# Patient Record
Sex: FEMALE | Race: WHITE | NOT HISPANIC OR LATINO | Employment: PART TIME | URBAN - METROPOLITAN AREA
[De-identification: names, ages, dates, MRNs, and addresses within clinical notes are randomized per-mention and may not be internally consistent; named-entity substitution may affect disease eponyms.]

---

## 2017-01-18 ENCOUNTER — HOSPITAL ENCOUNTER (OUTPATIENT)
Dept: MAMMOGRAPHY | Facility: CLINIC | Age: 47
Discharge: HOME | End: 2017-01-18
Attending: OBSTETRICS & GYNECOLOGY | Admitting: OBSTETRICS & GYNECOLOGY
Payer: COMMERCIAL

## 2017-01-18 DIAGNOSIS — Z12.31 VISIT FOR SCREENING MAMMOGRAM: ICD-10-CM

## 2017-01-18 PROCEDURE — 77063 BREAST TOMOSYNTHESIS BI: CPT

## 2018-01-19 ENCOUNTER — HOSPITAL ENCOUNTER (OUTPATIENT)
Dept: MAMMOGRAPHY | Facility: CLINIC | Age: 48
Discharge: HOME | End: 2018-01-19
Attending: OBSTETRICS & GYNECOLOGY | Admitting: OBSTETRICS & GYNECOLOGY
Payer: COMMERCIAL

## 2018-01-19 DIAGNOSIS — Z12.31 VISIT FOR SCREENING MAMMOGRAM: ICD-10-CM

## 2018-01-19 PROCEDURE — 77067 SCR MAMMO BI INCL CAD: CPT

## 2019-01-23 ENCOUNTER — HOSPITAL ENCOUNTER (OUTPATIENT)
Dept: MAMMOGRAPHY | Facility: CLINIC | Age: 49
Discharge: HOME | End: 2019-01-23
Attending: OBSTETRICS & GYNECOLOGY | Admitting: OBSTETRICS & GYNECOLOGY
Payer: COMMERCIAL

## 2019-01-23 DIAGNOSIS — Z12.31 VISIT FOR SCREENING MAMMOGRAM: ICD-10-CM

## 2019-01-23 PROCEDURE — 77063 BREAST TOMOSYNTHESIS BI: CPT

## 2020-05-07 ENCOUNTER — HOSPITAL ENCOUNTER (OUTPATIENT)
Dept: MAMMOGRAPHY | Facility: CLINIC | Age: 50
Discharge: HOME | End: 2020-05-07
Attending: OBSTETRICS & GYNECOLOGY | Admitting: OBSTETRICS & GYNECOLOGY
Payer: COMMERCIAL

## 2020-05-07 DIAGNOSIS — Z12.31 VISIT FOR SCREENING MAMMOGRAM: ICD-10-CM

## 2020-05-07 PROCEDURE — 77063 BREAST TOMOSYNTHESIS BI: CPT

## 2020-08-13 ENCOUNTER — NURSE TRIAGE (OUTPATIENT)
Dept: NURSING | Facility: CLINIC | Age: 50
End: 2020-08-13

## 2020-08-13 DIAGNOSIS — Z11.59 SCREENING FOR VIRAL DISEASE: Primary | ICD-10-CM

## 2020-08-13 NOTE — TELEPHONE ENCOUNTER
"Patient is calling requesting COVID serologic antibody testing.  NOTE: Serologic testing is a blood test for 'antibodies' which are made at 10-14 days after you have had symptoms of COVID or were exposed and had an asymptomatic infection.  This does NOT test you for 'active' infection or tell you if you are contagious.    Are you a healthcare worker? no  Do you currently have a cough, fever, body aches, shortness of breath, or difficulty breathing?  No  Did you previously have cough, fever, body aches, shortness of breath, or difficulty breathing that have now resolved? Has had previous covid symptoms.   Symptoms began over 30 days ago.  Symptoms started > 14 days ago. Lab order placed per SARS-CoV-2 Serology test Standing Order using indication \"Previously symptomatic >14d since onset, currently asymptomatic\" and diagnosis code \"Screening for viral disease\" (Z11.59)      The patient was informed: \"Testing is limited each day and it may take time for testing to be available to everyone who has called. You will receive a call within 48-72 hours to schedule the serology testing. Please confirm the best number to reach you is [phone]. If you have any questions about scheduling, call [phone].\"       "

## 2020-08-18 DIAGNOSIS — Z11.59 SCREENING FOR VIRAL DISEASE: ICD-10-CM

## 2020-08-18 PROCEDURE — 36415 COLL VENOUS BLD VENIPUNCTURE: CPT | Performed by: EMERGENCY MEDICINE

## 2020-08-18 PROCEDURE — 86769 SARS-COV-2 COVID-19 ANTIBODY: CPT | Performed by: EMERGENCY MEDICINE

## 2020-08-20 LAB
COVID-19 SPIKE RBD ABY TITER: NORMAL
COVID-19 SPIKE RBD ABY: NEGATIVE

## 2021-01-15 ENCOUNTER — HEALTH MAINTENANCE LETTER (OUTPATIENT)
Age: 51
End: 2021-01-15

## 2021-07-07 ENCOUNTER — HOSPITAL ENCOUNTER (OUTPATIENT)
Dept: MAMMOGRAPHY | Facility: CLINIC | Age: 51
Discharge: HOME | End: 2021-07-07
Attending: OBSTETRICS & GYNECOLOGY | Admitting: OBSTETRICS & GYNECOLOGY
Payer: COMMERCIAL

## 2021-07-07 DIAGNOSIS — Z12.31 VISIT FOR SCREENING MAMMOGRAM: ICD-10-CM

## 2021-07-07 PROCEDURE — 77063 BREAST TOMOSYNTHESIS BI: CPT

## 2021-09-04 ENCOUNTER — HEALTH MAINTENANCE LETTER (OUTPATIENT)
Age: 51
End: 2021-09-04

## 2022-02-19 ENCOUNTER — HEALTH MAINTENANCE LETTER (OUTPATIENT)
Age: 52
End: 2022-02-19

## 2022-10-16 ENCOUNTER — HEALTH MAINTENANCE LETTER (OUTPATIENT)
Age: 52
End: 2022-10-16

## 2022-12-02 ENCOUNTER — LAB REQUISITION (OUTPATIENT)
Dept: LAB | Facility: CLINIC | Age: 52
End: 2022-12-02
Payer: COMMERCIAL

## 2022-12-02 DIAGNOSIS — Z01.419 ENCOUNTER FOR GYNECOLOGICAL EXAMINATION (GENERAL) (ROUTINE) WITHOUT ABNORMAL FINDINGS: ICD-10-CM

## 2022-12-02 PROCEDURE — G0145 SCR C/V CYTO,THINLAYER,RESCR: HCPCS | Mod: ORL | Performed by: OBSTETRICS & GYNECOLOGY

## 2022-12-02 PROCEDURE — 87624 HPV HI-RISK TYP POOLED RSLT: CPT | Mod: ORL | Performed by: OBSTETRICS & GYNECOLOGY

## 2022-12-07 LAB
BKR LAB AP GYN ADEQUACY: NORMAL
BKR LAB AP GYN INTERPRETATION: NORMAL
BKR LAB AP HPV REFLEX: NORMAL
BKR LAB AP LMP: NORMAL
BKR LAB AP PREVIOUS ABNL DX: NORMAL
BKR LAB AP PREVIOUS ABNORMAL: NORMAL
PATH REPORT.COMMENTS IMP SPEC: NORMAL
PATH REPORT.COMMENTS IMP SPEC: NORMAL
PATH REPORT.RELEVANT HX SPEC: NORMAL

## 2022-12-09 LAB
HUMAN PAPILLOMA VIRUS 16 DNA: NEGATIVE
HUMAN PAPILLOMA VIRUS 18 DNA: NEGATIVE
HUMAN PAPILLOMA VIRUS FINAL DIAGNOSIS: NORMAL
HUMAN PAPILLOMA VIRUS OTHER HR: NEGATIVE

## 2023-04-01 ENCOUNTER — HEALTH MAINTENANCE LETTER (OUTPATIENT)
Age: 53
End: 2023-04-01

## 2023-11-04 ENCOUNTER — HEALTH MAINTENANCE LETTER (OUTPATIENT)
Age: 53
End: 2023-11-04

## 2024-03-06 ENCOUNTER — OFFICE VISIT (OUTPATIENT)
Dept: CARDIOLOGY | Facility: CLINIC | Age: 54
End: 2024-03-06
Payer: COMMERCIAL

## 2024-03-06 ENCOUNTER — LAB (OUTPATIENT)
Dept: LAB | Facility: CLINIC | Age: 54
End: 2024-03-06
Payer: COMMERCIAL

## 2024-03-06 VITALS
HEIGHT: 67 IN | WEIGHT: 170.3 LBS | BODY MASS INDEX: 26.73 KG/M2 | HEART RATE: 75 BPM | SYSTOLIC BLOOD PRESSURE: 153 MMHG | OXYGEN SATURATION: 97 % | DIASTOLIC BLOOD PRESSURE: 93 MMHG

## 2024-03-06 DIAGNOSIS — R00.0 TACHYCARDIA: ICD-10-CM

## 2024-03-06 DIAGNOSIS — R00.2 PALPITATIONS: ICD-10-CM

## 2024-03-06 DIAGNOSIS — R00.2 PALPITATIONS: Primary | ICD-10-CM

## 2024-03-06 LAB
ANION GAP SERPL CALCULATED.3IONS-SCNC: 13 MMOL/L (ref 7–15)
BUN SERPL-MCNC: 12.3 MG/DL (ref 6–20)
CALCIUM SERPL-MCNC: 9.5 MG/DL (ref 8.6–10)
CHLORIDE SERPL-SCNC: 103 MMOL/L (ref 98–107)
CREAT SERPL-MCNC: 0.88 MG/DL (ref 0.51–0.95)
DEPRECATED HCO3 PLAS-SCNC: 25 MMOL/L (ref 22–29)
EGFRCR SERPLBLD CKD-EPI 2021: 77 ML/MIN/1.73M2
GLUCOSE SERPL-MCNC: 105 MG/DL (ref 70–99)
POTASSIUM SERPL-SCNC: 3.9 MMOL/L (ref 3.4–5.3)
SODIUM SERPL-SCNC: 141 MMOL/L (ref 135–145)
TSH SERPL DL<=0.005 MIU/L-ACNC: 0.57 UIU/ML (ref 0.3–4.2)

## 2024-03-06 PROCEDURE — 80048 BASIC METABOLIC PNL TOTAL CA: CPT

## 2024-03-06 PROCEDURE — 36415 COLL VENOUS BLD VENIPUNCTURE: CPT

## 2024-03-06 PROCEDURE — 99204 OFFICE O/P NEW MOD 45 MIN: CPT | Performed by: INTERNAL MEDICINE

## 2024-03-06 PROCEDURE — 93000 ELECTROCARDIOGRAM COMPLETE: CPT | Performed by: INTERNAL MEDICINE

## 2024-03-06 PROCEDURE — 84443 ASSAY THYROID STIM HORMONE: CPT

## 2024-03-06 RX ORDER — APREMILAST 30 MG/1
TABLET, FILM COATED ORAL 2 TIMES DAILY
COMMUNITY
Start: 2024-03-05

## 2024-03-06 RX ORDER — MUPIROCIN 20 MG/G
OINTMENT TOPICAL DAILY
COMMUNITY
Start: 2024-02-03

## 2024-03-06 RX ORDER — ZOLPIDEM TARTRATE 12.5 MG/1
TABLET, FILM COATED, EXTENDED RELEASE ORAL AT BEDTIME
COMMUNITY
Start: 2024-02-12

## 2024-03-06 RX ORDER — GABAPENTIN 300 MG/1
300 CAPSULE ORAL 2 TIMES DAILY
COMMUNITY
Start: 2023-01-20

## 2024-03-06 RX ORDER — SUMATRIPTAN 100 MG/1
TABLET, FILM COATED ORAL
COMMUNITY

## 2024-03-06 RX ORDER — BETAMETHASONE DIPROPIONATE 0.5 MG/G
OINTMENT, AUGMENTED TOPICAL PRN
COMMUNITY
Start: 2024-01-02

## 2024-03-06 NOTE — PROGRESS NOTES
"  SERVICE DATE: March 6, 2024    REFERRING PROVIDER:  Referred Self, MD  No address on file    PRIMARY CARE PROVIDER:  Gustavo Herring  [address] [address]    REASON FOR VISIT:  Self-referral for palpitations.    HISTORY OF PRESENT ILLNESS:  Lena Rg is a pleasant postmenopausal 53 year old female, new to cardiology, unaccompanied today.  Her medical history is significant for psoriasis for which she takes Otezla, migraine headaches, iron deficiency anemia, never tobacco use, regular exerciser, BMI 27.    Patient is a regular exerciser and has been for decades.  On a routine basis, she goes to [employer] and does high intensity interval training almost every day.  She has no exercise limitation, specifically no chest pain, dyspnea, palpitations, presyncope or syncope.  She wears a [employer] watch and during exercise she has a robust chronotropic response and a heart rate goes up to the 160s bpm.  She has been exercising through most of her adult life.  Infrequent and social alcohol intake, 2 servings of caffeine daily, remains well-hydrated, no energy drinks, no tobacco or recreational drugs.  No concerning family history of cardiovascular disease.  She describes her life as stress-free.    She also suffers from chronic insomnia.  Lately she has been getting nightly cramps and was told during a sleep study that she has significant heart rate variability (but no arrhythmias).  Since finding this out, she has been more aware of her heart rate changing/variability during sleep.  She also intermittently feels palpitations but only at night.  To evaluate this, an EKG was done at [facility] physicians office and was reported as \"sinus rhythm with marked sinus arrhythmia and first-degree AV block\" which understandably concerned the patient and she sought a cardiology referral.   I independently interpreted her done here today EKG.  It shows sinus rhythm of 66 bpm, " "normal QTc of 426 ms, first-degree AV block with a mildly prolonged AR interval of 224 ms.    Her blood pressure today was elevated at 153/93, with a resting pulse of 75 bpm.  She says that at recent checkups, her blood pressure has been trending up.  She does not carry a diagnosis of hypertension.    I reviewed her labs in Care Everywhere which showed a recent CBC with a hemoglobin of 13.7.  She has not had any thyroid labs ordered a renal labs recently.    No prior cardiac imaging.    Her cardiopulmonary examination today is normal.  She has regular heart sounds, no audible murmur, no carotid bruit.  Lungs are clear.    DIAGNOSES/ASSESSMENT:  New symptom of palpitations and heart rate variability.  She is able to exercise with high intensity interval training with clearly an optimal chronotropic response with her heart rates going up to 160 bpm and no symptoms.  Will need a heart monitor to rule out arrhythmia.  She also needs basic screening labs such as a thyroid panel and BMP.  New diagnosis of first-degree AV block.  I reassured Izzy that this is frequently an incidental finding and often benign.  Will get an echocardiogram to rule out structural heart disease.  Elevated blood pressure without diagnosis of hypertension.  Patient tells me that generally, recently her blood pressure has been trending up.  This will have to be followed by her primary care team and medications instituted, as needed.  I note that sleep apnea has been ruled out on recent sleep study.    PLAN:  Labs today - TSH, basic metabolic panel.  Patient will be updated with results.  7-day Zio patch heart monitor.  Transthoracic echocardiogram.  Follow up with DEENA.      Yamilet Loyola MD      New patient.  The level of medical decision making during this visit was of moderate complexity.          Vitals: BP (!) 153/93   Pulse 75   Ht 1.689 m (5' 6.5\")   Wt 77.2 kg (170 lb 4.8 oz)   SpO2 97%   BMI 27.08 kg/m    Wt Readings from Last " 5 Encounters:   24 77.2 kg (170 lb 4.8 oz)   16 71.7 kg (158 lb 1.1 oz)   16 74.6 kg (164 lb 7.4 oz)   16 72.6 kg (160 lb)   08 72.6 kg (160 lb)         Orders Placed This Encounter   Procedures    TSH with free T4 reflex    Basic metabolic panel    Follow-Up with Cardiology DEENA    EKG 12-lead complete w/read - Clinics (performed today)    Echocardiogram Complete           CURRENT MEDICATIONS:  Current Outpatient Medications   Medication Sig Dispense Refill    augmented betamethasone dipropionate (DIPROLENE-AF) 0.05 % external ointment Apply topically as needed      Ferrous Sulfate (IRON PO) Takes on average 1-3 times per week      gabapentin (NEURONTIN) 300 MG capsule Take 300 mg by mouth 2 times daily      mupirocin (BACTROBAN) 2 % external ointment Apply topically daily      OTEZLA 30 MG tablet Take by mouth 2 times daily      SUMAtriptan (IMITREX) 100 MG tablet TAKE 1 TAB BY MOUTH AT ONSET OF MIGRAINE, MAY REPEAT AFTER 2 HRS AS NEEDED MAX 2 PER DAY FOR 30 DAYS      zolpidem ER (AMBIEN CR) 12.5 MG CR tablet at bedtime           ALLERGIES:  Allergies   Allergen Reactions    Amoxicillin Other (See Comments) and Unknown     Possible Hives    Cephalexin Hives     Rash    Clindamycin Hives     Rash    Dust Mites        PAST MEDICAL HISTORY:    Past Medical History:   Diagnosis Date    Chronic insomnia     Migraine     Psoriasis        PAST SURGICAL HISTORY:    Past Surgical History:   Procedure Laterality Date    C/SECTION, LOW TRANSVERSE      , Low Transverse X 2       FAMILY HISTORY:    Family History   Problem Relation Age of Onset    Cardiac Sudden Death No family hx of     Arrhythmia No family hx of     Cardiomyopathy No family hx of

## 2024-03-06 NOTE — LETTER
"3/6/2024    Gustavo Herring MD  [address] [facility] [address]  [facility] [address]    RE: Lena Rg       Dear Colleague,     I had the pleasure of seeing Lena Rg in the [facility].    SERVICE DATE: March 6, 2024    REFERRING PROVIDER:  Referred Self, MD  No address on file    PRIMARY CARE PROVIDER:  Gustavo Herring  [address] [facility] [address]  [facility] [address]    REASON FOR VISIT:  Self-referral for palpitations.    HISTORY OF PRESENT ILLNESS:  Lena Rg is a pleasant postmenopausal 53 year old female, new to cardiology, unaccompanied today.  Her medical history is significant for psoriasis for which she takes Otezla, migraine headaches, iron deficiency anemia, never tobacco use, regular exerciser, BMI 27.    Patient is a regular exerciser and has been for decades.  On a routine basis, she goes to [employer] and does high intensity interval training almost every day.  She has no exercise limitation, specifically no chest pain, dyspnea, palpitations, presyncope or syncope.  She wears a [employer] watch and during exercise she has a robust chronotropic response and a heart rate goes up to the 160s bpm.  She has been exercising through most of her adult life.  Infrequent and social alcohol intake, 2 servings of caffeine daily, remains well-hydrated, no energy drinks, no tobacco or recreational drugs.  No concerning family history of cardiovascular disease.  She describes her life as stress-free.    She also suffers from chronic insomnia.  Lately she has been getting nightly cramps and was told during a sleep study that she has significant heart rate variability (but no arrhythmias).  Since finding this out, she has been more aware of her heart rate changing/variability during sleep.  She also intermittently feels palpitations but only at night.  To evaluate this, an EKG was done at [facility] physicians office and was reported as \"sinus rhythm with " "marked sinus arrhythmia and first-degree AV block\" which understandably concerned the patient and she sought a cardiology referral.   I independently interpreted her done here today EKG.  It shows sinus rhythm of 66 bpm, normal QTc of 426 ms, first-degree AV block with a mildly prolonged CT interval of 224 ms.    Her blood pressure today was elevated at 153/93, with a resting pulse of 75 bpm.  She says that at recent checkups, her blood pressure has been trending up.  She does not carry a diagnosis of hypertension.    I reviewed her labs in Care Everywhere which showed a recent CBC with a hemoglobin of 13.7.  She has not had any thyroid labs ordered a renal labs recently.    No prior cardiac imaging.    Her cardiopulmonary examination today is normal.  She has regular heart sounds, no audible murmur, no carotid bruit.  Lungs are clear.    DIAGNOSES/ASSESSMENT:  New symptom of palpitations and heart rate variability.  She is able to exercise with high intensity interval training with clearly an optimal chronotropic response with her heart rates going up to 160 bpm and no symptoms.  Will need a heart monitor to rule out arrhythmia.  She also needs basic screening labs such as a thyroid panel and BMP.  New diagnosis of first-degree AV block.  I reassured Izzy that this is frequently an incidental finding and often benign.  Will get an echocardiogram to rule out structural heart disease.  Elevated blood pressure without diagnosis of hypertension.  Patient tells me that generally, recently her blood pressure has been trending up.  This will have to be followed by her primary care team and medications instituted, as needed.  I note that sleep apnea has been ruled out on recent sleep study.    PLAN:  Labs today - TSH, basic metabolic panel.  Patient will be updated with results.  7-day Zio patch heart monitor.  Transthoracic echocardiogram.  Follow up with DEENA.      Yamilet Loyola MD      New patient.  The level of " "medical decision making during this visit was of moderate complexity.          Vitals: BP (!) 153/93   Pulse 75   Ht 1.689 m (5' 6.5\")   Wt 77.2 kg (170 lb 4.8 oz)   SpO2 97%   BMI 27.08 kg/m    Wt Readings from Last 5 Encounters:   24 77.2 kg (170 lb 4.8 oz)   16 71.7 kg (158 lb 1.1 oz)   16 74.6 kg (164 lb 7.4 oz)   16 72.6 kg (160 lb)   08 72.6 kg (160 lb)         Orders Placed This Encounter   Procedures    TSH with free T4 reflex    Basic metabolic panel    Follow-Up with Cardiology DEENA    EKG 12-lead complete w/read - Clinics (performed today)    Echocardiogram Complete           CURRENT MEDICATIONS:  Current Outpatient Medications   Medication Sig Dispense Refill    augmented betamethasone dipropionate (DIPROLENE-AF) 0.05 % external ointment Apply topically as needed      Ferrous Sulfate (IRON PO) Takes on average 1-3 times per week      gabapentin (NEURONTIN) 300 MG capsule Take 300 mg by mouth 2 times daily      mupirocin (BACTROBAN) 2 % external ointment Apply topically daily      OTEZLA 30 MG tablet Take by mouth 2 times daily      SUMAtriptan (IMITREX) 100 MG tablet TAKE 1 TAB BY MOUTH AT ONSET OF MIGRAINE, MAY REPEAT AFTER 2 HRS AS NEEDED MAX 2 PER DAY FOR 30 DAYS      zolpidem ER (AMBIEN CR) 12.5 MG CR tablet at bedtime           ALLERGIES:  Allergies   Allergen Reactions    Amoxicillin Other (See Comments) and Unknown     Possible Hives    Cephalexin Hives     Rash    Clindamycin Hives     Rash    Dust Mites        PAST MEDICAL HISTORY:    Past Medical History:   Diagnosis Date    Chronic insomnia     Migraine     Psoriasis        PAST SURGICAL HISTORY:    Past Surgical History:   Procedure Laterality Date    C/SECTION, LOW TRANSVERSE      , Low Transverse X 2       FAMILY HISTORY:    Family History   Problem Relation Age of Onset    Cardiac Sudden Death No family hx of     Arrhythmia No family hx of     Cardiomyopathy No family hx of        Thank you for " allowing me to participate in the care of your patient.      Sincerely,     Yamilet Loyola MD     [facility]  cc:   Referred Self,

## 2024-03-07 ENCOUNTER — TELEPHONE (OUTPATIENT)
Dept: CARDIOLOGY | Facility: CLINIC | Age: 54
End: 2024-03-07
Payer: COMMERCIAL

## 2024-03-07 NOTE — TELEPHONE ENCOUNTER
BMP and TSH done after Dr. Loyola OV 3-6-24 fpr palpitations.   BMP - Na 141, K 3.9, BUN 12.3, Creat 0.88, GFR  77 . Glu 105,   TSH  - 0.57    Next DEENA OV 5-6-24    Last Dr. Loyola OV 3-6-24  PLAN:  Labs today - TSH, basic metabolic panel.  Patient will be updated with results.  7-day Zio patch heart monitor.  Transthoracic echocardiogram.  Follow up with DEENA.

## 2024-03-20 ENCOUNTER — ORDERS ONLY (AUTO-RELEASED) (OUTPATIENT)
Dept: CARDIOLOGY | Facility: CLINIC | Age: 54
End: 2024-03-20
Payer: COMMERCIAL

## 2024-03-20 DIAGNOSIS — R00.2 PALPITATIONS: ICD-10-CM

## 2024-03-20 DIAGNOSIS — R00.0 TACHYCARDIA: ICD-10-CM

## 2024-03-23 ENCOUNTER — HEALTH MAINTENANCE LETTER (OUTPATIENT)
Age: 54
End: 2024-03-23

## 2024-04-10 PROCEDURE — 93244 EXT ECG>48HR<7D REV&INTERPJ: CPT | Performed by: INTERNAL MEDICINE

## 2024-04-11 ENCOUNTER — HOSPITAL ENCOUNTER (OUTPATIENT)
Dept: CARDIOLOGY | Facility: CLINIC | Age: 54
Discharge: HOME | End: 2024-04-11
Attending: INTERNAL MEDICINE | Admitting: INTERNAL MEDICINE
Payer: COMMERCIAL

## 2024-04-11 DIAGNOSIS — R00.0 TACHYCARDIA: ICD-10-CM

## 2024-04-11 DIAGNOSIS — R00.2 PALPITATIONS: ICD-10-CM

## 2024-04-11 LAB — LVEF ECHO: NORMAL

## 2024-04-11 PROCEDURE — 93306 TTE W/DOPPLER COMPLETE: CPT

## 2024-04-11 PROCEDURE — 93306 TTE W/DOPPLER COMPLETE: CPT | Mod: 26 | Performed by: INTERNAL MEDICINE

## 2024-04-18 ENCOUNTER — MYC MEDICAL ADVICE (OUTPATIENT)
Dept: CARDIOLOGY | Facility: CLINIC | Age: 54
End: 2024-04-18
Payer: COMMERCIAL

## 2024-04-18 NOTE — TELEPHONE ENCOUNTER
My chart message from patient:  Arcenio Izzy [facility] Heart Team 2  Phone Number: [phone]     Hi. I was going to get labs at my primary but haven't gotten in. Dr. Loyola mentioned general panels, cholesterol check and Ferritin levels. Should I get an order [facility] at [facility] now so they are ready to go or is it not necessary for my follow up on May 6?  ==========================    Patient to see DEENA Michelle Arrington on 5/6/2024    Reply to patient:    Wilson Ms. Arcenio,    Dr. Loyola checked a bmp and a TSH at your March visit. These were normal. She did not order any other labs for spring.    Rechecks for your anemia should be done with your family clinic. They are not needed for the May visit.    Thank you for checking  Team 2 R.N.s  [phone]

## 2024-04-24 ENCOUNTER — LAB REQUISITION (OUTPATIENT)
Dept: LAB | Facility: CLINIC | Age: 54
End: 2024-04-24
Payer: COMMERCIAL

## 2024-04-24 DIAGNOSIS — Z13.9 ENCOUNTER FOR SCREENING, UNSPECIFIED: ICD-10-CM

## 2024-04-24 DIAGNOSIS — Z13.228 ENCOUNTER FOR SCREENING FOR OTHER METABOLIC DISORDERS: ICD-10-CM

## 2024-04-24 DIAGNOSIS — Z13.220 ENCOUNTER FOR SCREENING FOR LIPOID DISORDERS: ICD-10-CM

## 2024-04-24 LAB
ALBUMIN SERPL BCG-MCNC: 4.3 G/DL (ref 3.5–5.2)
ALP SERPL-CCNC: 100 U/L (ref 40–150)
ALT SERPL W P-5'-P-CCNC: 29 U/L (ref 0–50)
ANION GAP SERPL CALCULATED.3IONS-SCNC: 11 MMOL/L (ref 7–15)
AST SERPL W P-5'-P-CCNC: 24 U/L (ref 0–45)
BASOPHILS # BLD AUTO: 0.1 10E3/UL (ref 0–0.2)
BASOPHILS NFR BLD AUTO: 1 %
BILIRUB SERPL-MCNC: 0.5 MG/DL
BUN SERPL-MCNC: 17.7 MG/DL (ref 6–20)
CALCIUM SERPL-MCNC: 9.9 MG/DL (ref 8.6–10)
CHLORIDE SERPL-SCNC: 105 MMOL/L (ref 98–107)
CHOLEST SERPL-MCNC: 254 MG/DL
CREAT SERPL-MCNC: 0.84 MG/DL (ref 0.51–0.95)
DEPRECATED HCO3 PLAS-SCNC: 24 MMOL/L (ref 22–29)
EGFRCR SERPLBLD CKD-EPI 2021: 82 ML/MIN/1.73M2
EOSINOPHIL # BLD AUTO: 0.3 10E3/UL (ref 0–0.7)
EOSINOPHIL NFR BLD AUTO: 3 %
ERYTHROCYTE [DISTWIDTH] IN BLOOD BY AUTOMATED COUNT: 15.2 % (ref 10–15)
FASTING STATUS PATIENT QL REPORTED: YES
GLUCOSE SERPL-MCNC: 111 MG/DL (ref 70–99)
HCT VFR BLD AUTO: 47 % (ref 35–47)
HDLC SERPL-MCNC: 111 MG/DL
HGB BLD-MCNC: 15.3 G/DL (ref 11.7–15.7)
IMM GRANULOCYTES # BLD: 0.1 10E3/UL
IMM GRANULOCYTES NFR BLD: 1 %
LDLC SERPL CALC-MCNC: 130 MG/DL
LYMPHOCYTES # BLD AUTO: 2.5 10E3/UL (ref 0.8–5.3)
LYMPHOCYTES NFR BLD AUTO: 28 %
MCH RBC QN AUTO: 27.3 PG (ref 26.5–33)
MCHC RBC AUTO-ENTMCNC: 32.6 G/DL (ref 31.5–36.5)
MCV RBC AUTO: 84 FL (ref 78–100)
MONOCYTES # BLD AUTO: 0.6 10E3/UL (ref 0–1.3)
MONOCYTES NFR BLD AUTO: 7 %
NEUTROPHILS # BLD AUTO: 5.5 10E3/UL (ref 1.6–8.3)
NEUTROPHILS NFR BLD AUTO: 60 %
NONHDLC SERPL-MCNC: 143 MG/DL
NRBC # BLD AUTO: 0 10E3/UL
NRBC BLD AUTO-RTO: 0 /100
PLATELET # BLD AUTO: 408 10E3/UL (ref 150–450)
POTASSIUM SERPL-SCNC: 4.6 MMOL/L (ref 3.4–5.3)
PROT SERPL-MCNC: 7.4 G/DL (ref 6.4–8.3)
RBC # BLD AUTO: 5.6 10E6/UL (ref 3.8–5.2)
SODIUM SERPL-SCNC: 140 MMOL/L (ref 135–145)
TRIGL SERPL-MCNC: 67 MG/DL
WBC # BLD AUTO: 9 10E3/UL (ref 4–11)

## 2024-04-24 PROCEDURE — 80053 COMPREHEN METABOLIC PANEL: CPT | Mod: ORL | Performed by: OBSTETRICS & GYNECOLOGY

## 2024-04-24 PROCEDURE — 85025 COMPLETE CBC W/AUTO DIFF WBC: CPT | Mod: ORL | Performed by: OBSTETRICS & GYNECOLOGY

## 2024-04-24 PROCEDURE — 84443 ASSAY THYROID STIM HORMONE: CPT | Mod: ORL | Performed by: OBSTETRICS & GYNECOLOGY

## 2024-04-24 PROCEDURE — 80061 LIPID PANEL: CPT | Mod: ORL | Performed by: OBSTETRICS & GYNECOLOGY

## 2024-04-25 LAB — TSH SERPL DL<=0.005 MIU/L-ACNC: 0.83 UIU/ML (ref 0.3–4.2)

## 2024-05-06 ENCOUNTER — OFFICE VISIT (OUTPATIENT)
Dept: CARDIOLOGY | Facility: CLINIC | Age: 54
End: 2024-05-06
Attending: INTERNAL MEDICINE
Payer: COMMERCIAL

## 2024-05-06 VITALS
HEIGHT: 67 IN | RESPIRATION RATE: 15 BRPM | OXYGEN SATURATION: 100 % | WEIGHT: 170 LBS | DIASTOLIC BLOOD PRESSURE: 89 MMHG | BODY MASS INDEX: 26.68 KG/M2 | HEART RATE: 75 BPM | SYSTOLIC BLOOD PRESSURE: 138 MMHG

## 2024-05-06 DIAGNOSIS — I44.0 FIRST DEGREE AV BLOCK: Primary | ICD-10-CM

## 2024-05-06 DIAGNOSIS — R00.2 PALPITATIONS: ICD-10-CM

## 2024-05-06 PROCEDURE — 99213 OFFICE O/P EST LOW 20 MIN: CPT

## 2024-05-06 NOTE — PATIENT INSTRUCTIONS
Thank you for your visit with the [facility] today.    Today's plan:   Medication changes: none today   Follow up with cardiology as needed     If you have questions or concerns please call the nurse team at [phone] or send a [employer] message.     Scheduling phone number: [phone]    It was a pleasure seeing you today!     Michelle Arrington PA-C   Physician Assistant   [facility]

## 2024-05-06 NOTE — LETTER
5/6/2024    Gustavo Herring MD  [address]  [facility] [address]    RE: Lena Rg       Dear Colleague,     I had the pleasure of seeing Lena Rg in the [facility].              ~Cardiology Clinic Visit~    Lena Rg MRN# [id number]   YOB: 1970 Age: 53 year old   Primary Cardiologist: Dr. Loyola           Assessment and Plan:   Lean Rg is a very pleasant 53 year old female who is here today for follow up on test results     Palpitations  Zio monitor 4/2024- min HR of 47 bpm, max HR of 185 bpm, and avg HR of 83 bpm. Predominant underlying rhythm was Sinus Rhythm. First Degree AV Block was present. 1 run of Supraventricular Tachycardia occurred lasting 6 beats with a max rate of 139 bpm (avg 108 bpm).  TSH 3/2024- 0.57  BMP 3/2024-electrolytes and kidney function all within normal limits  First-degree AV block   Echo 4/2024-LVEF 55-60%, normal left ventricular systolic function, subtle regional wall motion abnormalities, it was noted that the study was technically difficult  Elevated blood pressure without diagnosis of hypertension   Sub optimal control today,   Followed by primary care   Insomnia  Following with sleep medicine            Changes today: none    Follow up with cardiology as needed       Michelle Arrington PA-C  Physician Assistant   [facility]- [facility]  Pager: [phone]          History of Presenting Illness:    Lena Rg is a very pleasant 53 year old female with a history of psoriasis for which she takes Otezla, migraine headaches, and iron deficiency anemia     In brief, patient established cardiology care 3/2024 for palpitations. A Zio monitor, thyroid panel, and BMP were ordered. She also has a new diagnosis of first-degree AV block, so an echo was ordered to assess the structure of the heart. Patient is here today to follow up on test results.     Patient reports feeling  well from a cardiovascular standpoint.  She continues to notice palpitations but they have not increased in frequency or intensity.  Her main concern at this time is she is struggling with sleep. She continues to exercise at [employer] several times throughout the week.  While exercising, patient denies any palpitations and states she actually feels good when her heart rate is going 130-140.  Again, while exercising she denies lightheadedness and dizziness.  She drinks 2 cups of coffee daily.  She occasionally drinks maybe 1 alcoholic beverage a week.  She states life in general is stress free, however, this week is quite stressful for her.  Patient is helping move her father-in-law into assisted living, her sister is moving into her house as she helps her recover from a hip replacement surgery, and her daughter is moving home from college.  Overall, patient states she is handling everything well.    Denies shortness of breath, orthopnea and PND. Denies chest pain, lightheadedness, dizziness, near syncope and syncope.     Blood pressure 138/89 and HR 75 in clinic today.           Social History       Social History     Socioeconomic History    Marital status:      Spouse name: Not on file    Number of children: Not on file    Years of education: Not on file    Highest education level: Not on file   Occupational History    Not on file   Tobacco Use    Smoking status: Never    Smokeless tobacco: Never   Substance and Sexual Activity    Alcohol use: Yes     Alcohol/week: 2.0 standard drinks of alcohol     Types: 2 Standard drinks or equivalent per week    Drug use: Never    Sexual activity: Yes     Partners: Male   Other Topics Concern    Not on file   Social History Narrative    Not on file     Social Determinants of Health     Financial Resource Strain: Not on file   Food Insecurity: Not on file   Transportation Needs: Not on file   Physical Activity: Not on file   Stress: Not on file   Social  Connections: Not on file   Interpersonal Safety: Unknown (3/11/2024)    Received from [facility]    Humiliation, Afraid, Rape, and Kick questionnaire     Fear of Current or Ex-Partner: Not on file     Emotionally Abused: Not on file     Physically Abused: No     Sexually Abused: No   Housing Stability: Not on file            Review of Systems:   Please see HPI         Physical Exam:   Vitals: There were no vitals taken for this visit.   Wt Readings from Last 4 Encounters:   03/06/24 77.2 kg (170 lb 4.8 oz)   09/30/16 71.7 kg (158 lb 1.1 oz)   09/27/16 74.6 kg (164 lb 7.4 oz)   09/25/16 72.6 kg (160 lb)     GEN: well nourished, in no acute distress.  NECK: Supple. JVD was not appreciated.   C/V:  Regular rate and rhythm, no murmur, rub or gallop.    RESP: Respirations are unlabored. Clear to auscultation bilaterally without wheezing, rales, or rhonchi.  EXTREM: Bilateral lower extremities with no edema.   SKIN: Warm and dry.        Data:   LIPID RESULTS:  Lab Results   Component Value Date    CHOL 254 (H) 04/24/2024     04/24/2024     (H) 04/24/2024    TRIG 67 04/24/2024     LIVER ENZYME RESULTS:  Lab Results   Component Value Date    AST 24 04/24/2024    AST 35 09/30/2016    ALT 29 04/24/2024    ALT 51 (H) 09/30/2016     CBC RESULTS:  Lab Results   Component Value Date    WBC 9.0 04/24/2024    WBC 4.6 10/01/2016    RBC 5.60 (H) 04/24/2024    RBC 4.76 10/01/2016    HGB 15.3 04/24/2024    HGB 12.8 10/01/2016    HCT 47.0 04/24/2024    HCT 38.5 10/01/2016    MCV 84 04/24/2024    MCV 81 10/01/2016    MCH 27.3 04/24/2024    MCH 26.9 10/01/2016    MCHC 32.6 04/24/2024    MCHC 33.2 10/01/2016    RDW 15.2 (H) 04/24/2024    RDW 14.5 10/01/2016     04/24/2024     10/01/2016     BMP RESULTS:  Lab Results   Component Value Date     04/24/2024     10/01/2016    POTASSIUM 4.6 04/24/2024    POTASSIUM 4.2 10/01/2016    CHLORIDE 105 04/24/2024    CHLORIDE 106 10/01/2016    CO2 24 04/24/2024  "   CO2 28 10/01/2016    ANIONGAP 11 2024    ANIONGAP 5 10/01/2016     (H) 2024    GLC 81 10/01/2016    BUN 17.7 2024    BUN 8 10/01/2016    CR 0.84 2024    CR 0.83 10/01/2016    GFRESTIMATED 82 2024    GFRESTIMATED 73 10/01/2016    GFRESTBLACK 88 10/01/2016    JEFFERY 9.9 2024    JEFFREY 8.7 10/01/2016      A1C RESULTS:  No results found for: \"A1C\"  INR RESULTS:  No results found for: \"INR\"         Medications     Current Outpatient Medications   Medication Sig Dispense Refill    augmented betamethasone dipropionate (DIPROLENE-AF) 0.05 % external ointment Apply topically as needed      Ferrous Sulfate (IRON PO) Takes on average 1-3 times per week      gabapentin (NEURONTIN) 300 MG capsule Take 300 mg by mouth 2 times daily      mupirocin (BACTROBAN) 2 % external ointment Apply topically daily      OTEZLA 30 MG tablet Take by mouth 2 times daily      SUMAtriptan (IMITREX) 100 MG tablet TAKE 1 TAB BY MOUTH AT ONSET OF MIGRAINE, MAY REPEAT AFTER 2 HRS AS NEEDED MAX 2 PER DAY FOR 30 DAYS      zolpidem ER (AMBIEN CR) 12.5 MG CR tablet at bedtime            Past Medical History     Past Medical History:   Diagnosis Date    Chronic insomnia     Migraine     Psoriasis      Past Surgical History:   Procedure Laterality Date    C/SECTION, LOW TRANSVERSE      , Low Transverse X 2     Family History   Problem Relation Age of Onset    Cardiac Sudden Death No family hx of     Arrhythmia No family hx of     Cardiomyopathy No family hx of             Allergies   Amoxicillin, Cephalexin, Clindamycin, and Dust mites      This note was completed in part using dictation via the Dragon voice recognition software. Some word and grammatical errors may occur and must be interpreted in the appropriate clinical context.  If there are any questions pertaining to this issue, please contact me for further clarification.      Thank you for allowing me to participate in the care of your " patient.      Sincerely,     Michelle Arrington PA-C     [facility]  cc:   Yamilet Loyola MD  [address] [place] [id number]

## 2024-05-06 NOTE — PROGRESS NOTES
~Cardiology Clinic Visit~    Lena Rg MRN# [id number]   YOB: 1970 Age: 53 year old   Primary Cardiologist: Dr. Loyola           Assessment and Plan:   Lena Rg is a very pleasant 53 year old female who is here today for follow up on test results     Palpitations  Zio monitor 4/2024- min HR of 47 bpm, max HR of 185 bpm, and avg HR of 83 bpm. Predominant underlying rhythm was Sinus Rhythm. First Degree AV Block was present. 1 run of Supraventricular Tachycardia occurred lasting 6 beats with a max rate of 139 bpm (avg 108 bpm).  TSH 3/2024- 0.57  BMP 3/2024-electrolytes and kidney function all within normal limits  First-degree AV block   Echo 4/2024-LVEF 55-60%, normal left ventricular systolic function, subtle regional wall motion abnormalities, it was noted that the study was technically difficult  Elevated blood pressure without diagnosis of hypertension   Sub optimal control today,   Followed by primary care   Insomnia  Following with sleep medicine            Changes today: none    Follow up with cardiology as needed       Michelle Arrington PA-C  Physician Assistant   [facility]  Pager: [phone]          History of Presenting Illness:    Lena Rg is a very pleasant 53 year old female with a history of psoriasis for which she takes Otezla, migraine headaches, and iron deficiency anemia     In brief, patient established cardiology care 3/2024 for palpitations. A Zio monitor, thyroid panel, and BMP were ordered. She also has a new diagnosis of first-degree AV block, so an echo was ordered to assess the structure of the heart. Patient is here today to follow up on test results.     Patient reports feeling well from a cardiovascular standpoint.  She continues to notice palpitations but they have not increased in frequency or intensity.  Her main concern at this time is she is struggling with sleep. She continues to exercise at  [employer] several times throughout the week.  While exercising, patient denies any palpitations and states she actually feels good when her heart rate is going 130-140.  Again, while exercising she denies lightheadedness and dizziness.  She drinks 2 cups of coffee daily.  She occasionally drinks maybe 1 alcoholic beverage a week.  She states life in general is stress free, however, this week is quite stressful for her.  Patient is helping move her father-in-law into assisted living, her sister is moving into her house as she helps her recover from a hip replacement surgery, and her daughter is moving home from college.  Overall, patient states she is handling everything well.    Denies shortness of breath, orthopnea and PND. Denies chest pain, lightheadedness, dizziness, near syncope and syncope.     Blood pressure 138/89 and HR 75 in clinic today.           Social History       Social History     Socioeconomic History    Marital status:      Spouse name: Not on file    Number of children: Not on file    Years of education: Not on file    Highest education level: Not on file   Occupational History    Not on file   Tobacco Use    Smoking status: Never    Smokeless tobacco: Never   Substance and Sexual Activity    Alcohol use: Yes     Alcohol/week: 2.0 standard drinks of alcohol     Types: 2 Standard drinks or equivalent per week    Drug use: Never    Sexual activity: Yes     Partners: Male   Other Topics Concern    Not on file   Social History Narrative    Not on file     Social Determinants of Health     Financial Resource Strain: Not on file   Food Insecurity: Not on file   Transportation Needs: Not on file   Physical Activity: Not on file   Stress: Not on file   Social Connections: Not on file   Interpersonal Safety: Unknown (3/11/2024)    Received from [facility]    Humiliation, Afraid, Rape, and Kick questionnaire     Fear of Current or Ex-Partner: Not on file     Emotionally Abused: Not  on file     Physically Abused: No     Sexually Abused: No   Housing Stability: Not on file            Review of Systems:   Please see HPI         Physical Exam:   Vitals: There were no vitals taken for this visit.   Wt Readings from Last 4 Encounters:   03/06/24 77.2 kg (170 lb 4.8 oz)   09/30/16 71.7 kg (158 lb 1.1 oz)   09/27/16 74.6 kg (164 lb 7.4 oz)   09/25/16 72.6 kg (160 lb)     GEN: well nourished, in no acute distress.  NECK: Supple. JVD was not appreciated.   C/V:  Regular rate and rhythm, no murmur, rub or gallop.    RESP: Respirations are unlabored. Clear to auscultation bilaterally without wheezing, rales, or rhonchi.  EXTREM: Bilateral lower extremities with no edema.   SKIN: Warm and dry.        Data:   LIPID RESULTS:  Lab Results   Component Value Date    CHOL 254 (H) 04/24/2024     04/24/2024     (H) 04/24/2024    TRIG 67 04/24/2024     LIVER ENZYME RESULTS:  Lab Results   Component Value Date    AST 24 04/24/2024    AST 35 09/30/2016    ALT 29 04/24/2024    ALT 51 (H) 09/30/2016     CBC RESULTS:  Lab Results   Component Value Date    WBC 9.0 04/24/2024    WBC 4.6 10/01/2016    RBC 5.60 (H) 04/24/2024    RBC 4.76 10/01/2016    HGB 15.3 04/24/2024    HGB 12.8 10/01/2016    HCT 47.0 04/24/2024    HCT 38.5 10/01/2016    MCV 84 04/24/2024    MCV 81 10/01/2016    MCH 27.3 04/24/2024    MCH 26.9 10/01/2016    MCHC 32.6 04/24/2024    MCHC 33.2 10/01/2016    RDW 15.2 (H) 04/24/2024    RDW 14.5 10/01/2016     04/24/2024     10/01/2016     BMP RESULTS:  Lab Results   Component Value Date     04/24/2024     10/01/2016    POTASSIUM 4.6 04/24/2024    POTASSIUM 4.2 10/01/2016    CHLORIDE 105 04/24/2024    CHLORIDE 106 10/01/2016    CO2 24 04/24/2024    CO2 28 10/01/2016    ANIONGAP 11 04/24/2024    ANIONGAP 5 10/01/2016     (H) 04/24/2024    GLC 81 10/01/2016    BUN 17.7 04/24/2024    BUN 8 10/01/2016    CR 0.84 04/24/2024    CR 0.83 10/01/2016    GFRESTIMATED 82  "2024    GFRESTIMATED 73 10/01/2016    GFRESTBLACK 88 10/01/2016    JEFFERY 9.9 2024    JEFFERY 8.7 10/01/2016      A1C RESULTS:  No results found for: \"A1C\"  INR RESULTS:  No results found for: \"INR\"         Medications     Current Outpatient Medications   Medication Sig Dispense Refill    augmented betamethasone dipropionate (DIPROLENE-AF) 0.05 % external ointment Apply topically as needed      Ferrous Sulfate (IRON PO) Takes on average 1-3 times per week      gabapentin (NEURONTIN) 300 MG capsule Take 300 mg by mouth 2 times daily      mupirocin (BACTROBAN) 2 % external ointment Apply topically daily      OTEZLA 30 MG tablet Take by mouth 2 times daily      SUMAtriptan (IMITREX) 100 MG tablet TAKE 1 TAB BY MOUTH AT ONSET OF MIGRAINE, MAY REPEAT AFTER 2 HRS AS NEEDED MAX 2 PER DAY FOR 30 DAYS      zolpidem ER (AMBIEN CR) 12.5 MG CR tablet at bedtime            Past Medical History     Past Medical History:   Diagnosis Date    Chronic insomnia     Migraine     Psoriasis      Past Surgical History:   Procedure Laterality Date    C/SECTION, LOW TRANSVERSE      , Low Transverse X 2     Family History   Problem Relation Age of Onset    Cardiac Sudden Death No family hx of     Arrhythmia No family hx of     Cardiomyopathy No family hx of             Allergies   Amoxicillin, Cephalexin, Clindamycin, and Dust mites      This note was completed in part using dictation via the Dragon voice recognition software. Some word and grammatical errors may occur and must be interpreted in the appropriate clinical context.  If there are any questions pertaining to this issue, please contact me for further clarification.  "

## 2024-05-07 ENCOUNTER — HOSPITAL ENCOUNTER (OUTPATIENT)
Dept: MAMMOGRAPHY | Facility: CLINIC | Age: 54
Discharge: HOME | End: 2024-05-07
Attending: OBSTETRICS & GYNECOLOGY
Payer: COMMERCIAL

## 2024-05-07 ENCOUNTER — HOSPITAL ENCOUNTER (OUTPATIENT)
Dept: BONE DENSITY | Facility: CLINIC | Age: 54
Discharge: HOME | End: 2024-05-07
Attending: OBSTETRICS & GYNECOLOGY
Payer: COMMERCIAL

## 2024-05-07 DIAGNOSIS — Z12.31 VISIT FOR SCREENING MAMMOGRAM: ICD-10-CM

## 2024-05-07 DIAGNOSIS — Z13.820 ENCOUNTER FOR SCREENING FOR OSTEOPOROSIS: ICD-10-CM

## 2024-05-07 PROCEDURE — 77063 BREAST TOMOSYNTHESIS BI: CPT

## 2024-05-07 PROCEDURE — 77080 DXA BONE DENSITY AXIAL: CPT

## 2024-11-19 ENCOUNTER — LAB REQUISITION (OUTPATIENT)
Dept: LAB | Facility: CLINIC | Age: 54
End: 2024-11-19
Payer: COMMERCIAL

## 2024-11-19 DIAGNOSIS — R73.09 OTHER ABNORMAL GLUCOSE: ICD-10-CM

## 2024-11-19 DIAGNOSIS — Z13.1 ENCOUNTER FOR SCREENING FOR DIABETES MELLITUS: ICD-10-CM

## 2024-11-19 LAB
ALBUMIN SERPL BCG-MCNC: 3.9 G/DL (ref 3.5–5.2)
ALP SERPL-CCNC: 87 U/L (ref 40–150)
ALT SERPL W P-5'-P-CCNC: 21 U/L (ref 0–50)
ANION GAP SERPL CALCULATED.3IONS-SCNC: 10 MMOL/L (ref 7–15)
AST SERPL W P-5'-P-CCNC: 30 U/L (ref 0–45)
BILIRUB SERPL-MCNC: 0.4 MG/DL
BUN SERPL-MCNC: 17.9 MG/DL (ref 6–20)
CALCIUM SERPL-MCNC: 9.6 MG/DL (ref 8.8–10.4)
CHLORIDE SERPL-SCNC: 108 MMOL/L (ref 98–107)
CHOLEST SERPL-MCNC: 237 MG/DL
CREAT SERPL-MCNC: 0.84 MG/DL (ref 0.51–0.95)
EGFRCR SERPLBLD CKD-EPI 2021: 81 ML/MIN/1.73M2
EST. AVERAGE GLUCOSE BLD GHB EST-MCNC: 111 MG/DL
FASTING STATUS PATIENT QL REPORTED: YES
GLUCOSE SERPL-MCNC: 85 MG/DL (ref 70–99)
HBA1C MFR BLD: 5.5 %
HCO3 SERPL-SCNC: 23 MMOL/L (ref 22–29)
HDLC SERPL-MCNC: 98 MG/DL
LDLC SERPL CALC-MCNC: 128 MG/DL
NONHDLC SERPL-MCNC: 139 MG/DL
POTASSIUM SERPL-SCNC: 4.5 MMOL/L (ref 3.4–5.3)
PROT SERPL-MCNC: 6.7 G/DL (ref 6.4–8.3)
SODIUM SERPL-SCNC: 141 MMOL/L (ref 135–145)
TRIGL SERPL-MCNC: 53 MG/DL

## 2025-04-12 ENCOUNTER — HEALTH MAINTENANCE LETTER (OUTPATIENT)
Age: 55
End: 2025-04-12